# Patient Record
Sex: MALE | HISPANIC OR LATINO
[De-identification: names, ages, dates, MRNs, and addresses within clinical notes are randomized per-mention and may not be internally consistent; named-entity substitution may affect disease eponyms.]

---

## 2024-09-16 ENCOUNTER — APPOINTMENT (OUTPATIENT)
Dept: VASCULAR SURGERY | Facility: CLINIC | Age: 54
End: 2024-09-16
Payer: COMMERCIAL

## 2024-09-16 VITALS
DIASTOLIC BLOOD PRESSURE: 80 MMHG | WEIGHT: 224 LBS | SYSTOLIC BLOOD PRESSURE: 118 MMHG | BODY MASS INDEX: 36 KG/M2 | HEIGHT: 66 IN | HEART RATE: 87 BPM

## 2024-09-16 DIAGNOSIS — I10 ESSENTIAL (PRIMARY) HYPERTENSION: ICD-10-CM

## 2024-09-16 DIAGNOSIS — Z86.79 PERSONAL HISTORY OF OTHER DISEASES OF THE CIRCULATORY SYSTEM: ICD-10-CM

## 2024-09-16 DIAGNOSIS — I87.319 CHRONIC VENOUS HYPERTENSION (IDIOPATHIC) WITH ULCER OF UNSPECIFIED LOWER EXTREMITY: ICD-10-CM

## 2024-09-16 DIAGNOSIS — E78.5 HYPERLIPIDEMIA, UNSPECIFIED: ICD-10-CM

## 2024-09-16 DIAGNOSIS — F17.200 NICOTINE DEPENDENCE, UNSPECIFIED, UNCOMPLICATED: ICD-10-CM

## 2024-09-16 DIAGNOSIS — F32.A DEPRESSION, UNSPECIFIED: ICD-10-CM

## 2024-09-16 DIAGNOSIS — L97.909 CHRONIC VENOUS HYPERTENSION (IDIOPATHIC) WITH ULCER OF UNSPECIFIED LOWER EXTREMITY: ICD-10-CM

## 2024-09-16 DIAGNOSIS — Z78.9 OTHER SPECIFIED HEALTH STATUS: ICD-10-CM

## 2024-09-16 PROBLEM — Z00.00 ENCOUNTER FOR PREVENTIVE HEALTH EXAMINATION: Status: ACTIVE | Noted: 2024-09-16

## 2024-09-16 PROCEDURE — 93971 EXTREMITY STUDY: CPT

## 2024-09-16 PROCEDURE — 99204 OFFICE O/P NEW MOD 45 MIN: CPT

## 2024-09-16 RX ORDER — ASPIRIN 81 MG
81 TABLET, DELAYED RELEASE (ENTERIC COATED) ORAL
Refills: 0 | Status: ACTIVE | COMMUNITY

## 2024-09-16 RX ORDER — IBUPROFEN 200 MG/1
TABLET, FILM COATED ORAL
Refills: 0 | Status: ACTIVE | COMMUNITY

## 2024-09-16 RX ORDER — ROSUVASTATIN CALCIUM 20 MG/1
20 TABLET, FILM COATED ORAL
Refills: 0 | Status: ACTIVE | COMMUNITY

## 2024-09-16 RX ORDER — DESVENLAFAXINE 50 MG/1
50 TABLET, EXTENDED RELEASE ORAL
Refills: 0 | Status: ACTIVE | COMMUNITY

## 2024-09-16 RX ORDER — VARENICLINE 1 MG/1
1 TABLET, FILM COATED ORAL
Refills: 0 | Status: ACTIVE | COMMUNITY

## 2024-09-16 RX ORDER — LISINOPRIL AND HYDROCHLOROTHIAZIDE TABLETS 20; 25 MG/1; MG/1
20-25 TABLET ORAL
Refills: 0 | Status: ACTIVE | COMMUNITY

## 2024-09-16 RX ORDER — CEFADROXIL 500 MG/1
500 CAPSULE ORAL
Refills: 0 | Status: ACTIVE | COMMUNITY

## 2024-09-16 RX ORDER — SILVER SULFADIAZINE 10 MG/G
1 CREAM TOPICAL TWICE DAILY
Qty: 30 | Refills: 5 | Status: ACTIVE | COMMUNITY
Start: 2024-09-16 | End: 1900-01-01

## 2024-09-16 RX ORDER — VILAZODONE HYDROCHLORIDE 10 MG/1
10 TABLET, FILM COATED ORAL
Refills: 0 | Status: ACTIVE | COMMUNITY

## 2024-09-16 NOTE — HISTORY OF PRESENT ILLNESS
[FreeTextEntry1] : 52 yo male presents to the office with a chief complaint of a nonhealing ulceration of the left medial ankle area. The patient reports a long history of varicose veins. He reports that he has had several ulcerations which have healed. He reports a history of venous surgery in the past. He reports that he has had a nonhealing ulceration for the last 3 months. He reports worsening pain associated with the ulcer. He does not wear a compression stocking. He denies a history of DVT or SVT.

## 2024-09-16 NOTE — REASON FOR VISIT
[Initial Evaluation] : an initial evaluation [FreeTextEntry1] : nonhealing ulceration of the left leg

## 2024-09-16 NOTE — PHYSICAL EXAM
[JVD] : no jugular venous distention  [Normal Breath Sounds] : Normal breath sounds [Normal Rate and Rhythm] : normal rate and rhythm [2+] : left 2+ [Ankle Swelling (On Exam)] : present [Ankle Swelling On The Right] : mild [Varicose Veins Of Lower Extremities] : present [Varicose Veins Of The Left Leg] : of the left leg [] : of the left leg [Ankle Swelling On The Left] : moderate [Alert] : alert [Oriented to Person] : oriented to person [Oriented to Place] : oriented to place [Oriented to Time] : oriented to time [de-identified] : Awake and Alert [de-identified] : venous ulceration medial gator zone [de-identified] : Appropriate

## 2024-09-16 NOTE — ASSESSMENT
[FreeTextEntry1] : 52 yo male with a history of venous insufficiency.  He has a history of venous surgery in the past. He has a nonhealing ulceration of the left medial malleolus for the last 3 months. He underwent a venous duplex which demonstrated venous insufficiency with a markedly enlarged GSV. I recommended that he undergo a Varithena ablation of his left GSV. The risks including infection, bleeding and DVT and benefits of the procedure were discussed with the patient who agrees to proceed. He was instructed to apply Silvadene to the wound and to wear an ace wrap daily. He will follow up in 2 weeks.

## 2024-09-20 PROBLEM — I87.319 CHRONIC VENOUS HYPERTENSION W ULCERATION: Status: ACTIVE | Noted: 2024-09-16

## 2024-09-30 ENCOUNTER — APPOINTMENT (OUTPATIENT)
Dept: VASCULAR SURGERY | Facility: CLINIC | Age: 54
End: 2024-09-30
Payer: COMMERCIAL

## 2024-09-30 VITALS — WEIGHT: 222 LBS | HEIGHT: 66 IN | BODY MASS INDEX: 35.68 KG/M2

## 2024-09-30 PROCEDURE — 11042 DBRDMT SUBQ TIS 1ST 20SQCM/<: CPT

## 2024-09-30 NOTE — ASSESSMENT
[FreeTextEntry1] : 52 yo male with a history of venous insufficiency.  He has a history of venous surgery in the past. He has a nonhealing ulceration of the left medial malleolus for the last 3 months. The wound has is about 505 granulating and it was sharply debrided to clean underlying tissue with a 15 blade. Silvadene and an Ace wrap were applied.   The patient has venous insufficiency with a markedly enlarged GSV. I recommended that he undergo a Varithena ablation of his left GSV. The risks including infection, bleeding and DVT and benefits of the procedure were discussed with the patient who agrees to proceed. He was instructed to apply Silvadene to the wound and to wear an ace wrap daily. He will follow up in 2 weeks.

## 2024-09-30 NOTE — HISTORY OF PRESENT ILLNESS
[FreeTextEntry1] : 54 yo male presents to the office with a chief complaint of a nonhealing ulceration of the left medial ankle area. The patient reports a long history of varicose veins. He reports that he has had several ulcerations which have healed. He reports a history of venous surgery in the past. He reports that he has had a nonhealing ulceration for the last 3 months. He reports worsening pain associated with the ulcer. He does not wear a compression stocking. He denies a history of DVT or SVT.  [de-identified] : 52 yo male with a nonhealing ulceration of the left medial ankle area returns in follow up. The patient reports that he has been applying Silvadene to the wound and wearing an ACE wrap daily. He reports continued pain and swelling. He believes the ulceration is slightly increased in size.

## 2024-09-30 NOTE — END OF VISIT
[FreeTextEntry3] :  All medical record entries made by the jose aibe were at [facility] [place] [facility], direction and personally dictated by me on 9/30/2024. I have reviewed the chart and agree that the record accurately reflects my personal performance of the history, physical exam, assessment, and plan. I have also personally directed, reviewed, and agreed with the chart.

## 2024-09-30 NOTE — PHYSICAL EXAM
[Normal Breath Sounds] : Normal breath sounds [Normal Rate and Rhythm] : normal rate and rhythm [Ankle Swelling (On Exam)] : present [Ankle Swelling On The Right] : mild [Varicose Veins Of Lower Extremities] : present [Varicose Veins Of The Left Leg] : of the left leg [] : of the left leg [Ankle Swelling On The Left] : moderate [Alert] : alert [Oriented to Person] : oriented to person [Oriented to Place] : oriented to place [Oriented to Time] : oriented to time [JVD] : no jugular venous distention  [de-identified] : Awake and Alert [de-identified] : venous ulceration medial gator zone - fibrinous [id number] granulating - 50% , large varicose veins -left [de-identified] : Appropriate

## 2024-10-09 ENCOUNTER — APPOINTMENT (OUTPATIENT)
Dept: VASCULAR SURGERY | Facility: CLINIC | Age: 54
End: 2024-10-09
Payer: COMMERCIAL

## 2024-10-09 VITALS — SYSTOLIC BLOOD PRESSURE: 127 MMHG | HEART RATE: 75 BPM | DIASTOLIC BLOOD PRESSURE: 78 MMHG

## 2024-10-09 VITALS — HEART RATE: 86 BPM | DIASTOLIC BLOOD PRESSURE: 76 MMHG | SYSTOLIC BLOOD PRESSURE: 111 MMHG

## 2024-10-09 DIAGNOSIS — I83.893 VARICOSE VEINS OF BILATERAL LOWER EXTREMITIES WITH OTHER COMPLICATIONS: ICD-10-CM

## 2024-10-09 PROBLEM — I83.899: Status: ACTIVE | Noted: 2024-10-09

## 2024-10-09 PROCEDURE — 36465Z: CUSTOM | Mod: LT

## 2024-10-09 PROCEDURE — 36465Z: CUSTOM

## 2024-10-14 ENCOUNTER — APPOINTMENT (OUTPATIENT)
Dept: VASCULAR SURGERY | Facility: CLINIC | Age: 54
End: 2024-10-14

## 2024-10-14 DIAGNOSIS — I83.899 VARICOSE VEINS OF UNSPECIFIED LOWER EXTREMITY WITH OTHER COMPLICATIONS: ICD-10-CM

## 2024-10-14 DIAGNOSIS — L97.909 CHRONIC VENOUS HYPERTENSION (IDIOPATHIC) WITH ULCER OF UNSPECIFIED LOWER EXTREMITY: ICD-10-CM

## 2024-10-14 DIAGNOSIS — I87.319 CHRONIC VENOUS HYPERTENSION (IDIOPATHIC) WITH ULCER OF UNSPECIFIED LOWER EXTREMITY: ICD-10-CM

## 2024-10-14 PROCEDURE — 93971 EXTREMITY STUDY: CPT

## 2024-10-14 PROCEDURE — 99214 OFFICE O/P EST MOD 30 MIN: CPT

## 2024-11-11 ENCOUNTER — APPOINTMENT (OUTPATIENT)
Dept: VASCULAR SURGERY | Facility: CLINIC | Age: 54
End: 2024-11-11
Payer: COMMERCIAL

## 2024-11-11 ENCOUNTER — NON-APPOINTMENT (OUTPATIENT)
Age: 54
End: 2024-11-11

## 2024-11-11 VITALS
HEIGHT: 66 IN | BODY MASS INDEX: 35.68 KG/M2 | SYSTOLIC BLOOD PRESSURE: 119 MMHG | DIASTOLIC BLOOD PRESSURE: 82 MMHG | WEIGHT: 222 LBS | HEART RATE: 104 BPM

## 2024-11-11 DIAGNOSIS — I83.899 VARICOSE VEINS OF UNSPECIFIED LOWER EXTREMITY WITH OTHER COMPLICATIONS: ICD-10-CM

## 2024-11-11 DIAGNOSIS — I83.893 VARICOSE VEINS OF BILATERAL LOWER EXTREMITIES WITH OTHER COMPLICATIONS: ICD-10-CM

## 2024-11-11 DIAGNOSIS — I87.319 CHRONIC VENOUS HYPERTENSION (IDIOPATHIC) WITH ULCER OF UNSPECIFIED LOWER EXTREMITY: ICD-10-CM

## 2024-11-11 DIAGNOSIS — L97.909 CHRONIC VENOUS HYPERTENSION (IDIOPATHIC) WITH ULCER OF UNSPECIFIED LOWER EXTREMITY: ICD-10-CM

## 2024-11-11 PROCEDURE — 99213 OFFICE O/P EST LOW 20 MIN: CPT
